# Patient Record
Sex: FEMALE | Race: BLACK OR AFRICAN AMERICAN | NOT HISPANIC OR LATINO | Employment: UNEMPLOYED | URBAN - METROPOLITAN AREA
[De-identification: names, ages, dates, MRNs, and addresses within clinical notes are randomized per-mention and may not be internally consistent; named-entity substitution may affect disease eponyms.]

---

## 2019-10-08 ENCOUNTER — OFFICE VISIT (OUTPATIENT)
Dept: FAMILY MEDICINE | Facility: CLINIC | Age: 34
End: 2019-10-08
Payer: COMMERCIAL

## 2019-10-08 VITALS
TEMPERATURE: 97.8 F | WEIGHT: 142 LBS | SYSTOLIC BLOOD PRESSURE: 120 MMHG | DIASTOLIC BLOOD PRESSURE: 72 MMHG | HEART RATE: 90 BPM | RESPIRATION RATE: 14 BRPM | BODY MASS INDEX: 28.63 KG/M2 | OXYGEN SATURATION: 99 % | HEIGHT: 59 IN

## 2019-10-08 DIAGNOSIS — M54.2 NECK PAIN: Primary | ICD-10-CM

## 2019-10-08 DIAGNOSIS — M75.21 BICEPS TENDONITIS, RIGHT: ICD-10-CM

## 2019-10-08 DIAGNOSIS — M62.838 MUSCLE SPASM: ICD-10-CM

## 2019-10-08 PROCEDURE — 99203 OFFICE O/P NEW LOW 30 MIN: CPT | Performed by: PHYSICIAN ASSISTANT

## 2019-10-08 RX ORDER — PRENATAL VIT/IRON FUM/FOLIC AC 27MG-0.8MG
1 TABLET ORAL DAILY
COMMUNITY
End: 2024-06-25

## 2019-10-08 RX ORDER — ACETAMINOPHEN 500 MG
500-1000 TABLET ORAL EVERY 6 HOURS PRN
Qty: 60 TABLET | Refills: 0 | Status: SHIPPED | OUTPATIENT
Start: 2019-10-08 | End: 2024-06-25

## 2019-10-08 ASSESSMENT — MIFFLIN-ST. JEOR: SCORE: 1259.74

## 2019-10-08 NOTE — PROGRESS NOTES
"Subjective     Larissa Gonzalez is a 34 year old female who presents to clinic today for the following health issues:    HPI   Musculoskeletal problem/pain      Duration: 2 months    Description  Location: right shoulder    Intensity:  mild    Accompanying signs and symptoms: none    History  Previous similar problem: no   Previous evaluation:  none    Precipitating or alleviating factors:  Trauma or overuse: no   Aggravating factors include: moving up, back, or forward    Therapies tried and outcome: Have not tried anything. In the past she got massages but has not tried that this time.       Larissa has been experiencing pain in the right side of her neck and in the anterior portion of her shoulder over the past few months.  No specific injury noted.The pain is constant, worse with arm movement specifically with external rotation of right arm. No radicular symptoms. She has tried massages.  Of note she is currently breastfeeding her child.           There is no problem list on file for this patient.    No past surgical history on file.    Social History     Tobacco Use     Smoking status: Never Smoker     Smokeless tobacco: Never Used   Substance Use Topics     Alcohol use: No     No family history on file.      Current Outpatient Medications   Medication Sig Dispense Refill     acetaminophen (TYLENOL) 500 MG tablet Take 1-2 tablets (500-1,000 mg) by mouth every 6 hours as needed for mild pain 60 tablet 0     Prenatal Vit-Fe Fumarate-FA (PRENATAL MULTIVITAMIN W/IRON) 27-0.8 MG tablet Take 1 tablet by mouth daily       No Known Allergies      Reviewed and updated as needed this visit by Provider         Review of Systems   ROS COMP: Constitutional, HEENT, cardiovascular, pulmonary, gi and gu systems are negative, except as otherwise noted.      Objective    /72   Pulse 90   Temp 97.8  F (36.6  C) (Tympanic)   Resp 14   Ht 1.499 m (4' 11\")   Wt 64.4 kg (142 lb)   SpO2 99%   BMI 28.68 kg/m    Body mass index " "is 28.68 kg/m .  Physical Exam   GENERAL: healthy, alert and no distress  MS: TTP along right paraspinal neck muscles FROM of neck with pain on lateral rotation of the neck.  FROM of UE bilaterally.  TTP at site of insertion of proximal biceps tendon  NEURO: Normal strength and tone, mentation intact and speech normal  PSYCH: mentation appears normal, affect normal/bright    Diagnostic Test Results:  Labs reviewed in Epic        Assessment & Plan     1. Biceps tendonitis, right  Advise that she may use Tylenol for pain she would benefit from PT at this time this referral was placed.  She will follow up if new or worsening symptoms  - acetaminophen (TYLENOL) 500 MG tablet; Take 1-2 tablets (500-1,000 mg) by mouth every 6 hours as needed for mild pain  Dispense: 60 tablet; Refill: 0  - [facility] REFERR[facility]; Future    2. Neck pain  - acetaminophen (TYLENOL) 500 MG tablet; Take 1-2 tablets (500-1,000 mg) by mouth every 6 hours as needed for mild pain  Dispense: 60 tablet; Refill: 0  - [facility]FERRAL; Future    3. Muscle spasm  - acetaminophen (TYLENOL) 500 MG tablet; Take 1-2 tablets (500-1,000 mg) by mouth every 6 hours as needed for mild pain  Dispense: 60 tablet; Refill: 0  - [facility]FERRAL; Future     BMI:   Estimated body mass index is 28.68 kg/m  as calculated from the following:    Height as of this encounter: 1.499 m (4' 11\").    Weight as of this encounter: 64.4 kg (142 lb).         Follow up if new or worsening symptoms    No follow-ups on file.    José Miguel Hutson PA-C  [facility]      " allergic reaction

## 2024-06-25 ENCOUNTER — OFFICE VISIT (OUTPATIENT)
Dept: FAMILY MEDICINE | Facility: CLINIC | Age: 39
End: 2024-06-25
Payer: COMMERCIAL

## 2024-06-25 VITALS
WEIGHT: 153 LBS | HEIGHT: 60 IN | OXYGEN SATURATION: 100 % | RESPIRATION RATE: 16 BRPM | DIASTOLIC BLOOD PRESSURE: 64 MMHG | TEMPERATURE: 97.4 F | HEART RATE: 68 BPM | BODY MASS INDEX: 30.04 KG/M2 | SYSTOLIC BLOOD PRESSURE: 100 MMHG

## 2024-06-25 DIAGNOSIS — M25.551 RIGHT HIP PAIN: ICD-10-CM

## 2024-06-25 DIAGNOSIS — M79.672 LEFT FOOT PAIN: ICD-10-CM

## 2024-06-25 DIAGNOSIS — E55.9 VITAMIN D DEFICIENCY: ICD-10-CM

## 2024-06-25 DIAGNOSIS — Z13.220 ENCOUNTER FOR LIPID SCREENING FOR CARDIOVASCULAR DISEASE: ICD-10-CM

## 2024-06-25 DIAGNOSIS — Z13.6 ENCOUNTER FOR LIPID SCREENING FOR CARDIOVASCULAR DISEASE: ICD-10-CM

## 2024-06-25 DIAGNOSIS — Z00.00 ROUTINE GENERAL MEDICAL EXAMINATION AT A HEALTH CARE FACILITY: Primary | ICD-10-CM

## 2024-06-25 DIAGNOSIS — L20.9 ATOPIC DERMATITIS AND RELATED CONDITION: ICD-10-CM

## 2024-06-25 DIAGNOSIS — M25.511 ACUTE PAIN OF RIGHT SHOULDER: ICD-10-CM

## 2024-06-25 DIAGNOSIS — R53.83 FATIGUE, UNSPECIFIED TYPE: ICD-10-CM

## 2024-06-25 LAB
ALBUMIN SERPL BCG-MCNC: 4.1 G/DL (ref 3.5–5.2)
ALP SERPL-CCNC: 99 U/L (ref 40–150)
ALT SERPL W P-5'-P-CCNC: 13 U/L (ref 0–50)
ANION GAP SERPL CALCULATED.3IONS-SCNC: 9 MMOL/L (ref 7–15)
AST SERPL W P-5'-P-CCNC: 23 U/L (ref 0–45)
BILIRUB SERPL-MCNC: 0.3 MG/DL
BUN SERPL-MCNC: 11.5 MG/DL (ref 6–20)
CALCIUM SERPL-MCNC: 9 MG/DL (ref 8.6–10)
CHLORIDE SERPL-SCNC: 105 MMOL/L (ref 98–107)
CHOLEST SERPL-MCNC: 189 MG/DL
CREAT SERPL-MCNC: 0.53 MG/DL (ref 0.51–0.95)
DEPRECATED HCO3 PLAS-SCNC: 22 MMOL/L (ref 22–29)
EGFRCR SERPLBLD CKD-EPI 2021: >90 ML/MIN/1.73M2
ERYTHROCYTE [DISTWIDTH] IN BLOOD BY AUTOMATED COUNT: 12.9 % (ref 10–15)
FASTING STATUS PATIENT QL REPORTED: YES
FASTING STATUS PATIENT QL REPORTED: YES
GLUCOSE SERPL-MCNC: 92 MG/DL (ref 70–99)
HCT VFR BLD AUTO: 35.3 % (ref 35–47)
HDLC SERPL-MCNC: 48 MG/DL
HGB BLD-MCNC: 12.1 G/DL (ref 11.7–15.7)
IRON BINDING CAPACITY (ROCHE): 301 UG/DL (ref 240–430)
IRON SATN MFR SERPL: 16 % (ref 15–46)
IRON SERPL-MCNC: 48 UG/DL (ref 37–145)
LDLC SERPL CALC-MCNC: 125 MG/DL
MCH RBC QN AUTO: 29.6 PG (ref 26.5–33)
MCHC RBC AUTO-ENTMCNC: 34.3 G/DL (ref 31.5–36.5)
MCV RBC AUTO: 86 FL (ref 78–100)
NONHDLC SERPL-MCNC: 141 MG/DL
PLATELET # BLD AUTO: 232 10E3/UL (ref 150–450)
POTASSIUM SERPL-SCNC: 3.9 MMOL/L (ref 3.4–5.3)
PROT SERPL-MCNC: 7.4 G/DL (ref 6.4–8.3)
RBC # BLD AUTO: 4.09 10E6/UL (ref 3.8–5.2)
SODIUM SERPL-SCNC: 136 MMOL/L (ref 135–145)
TRIGL SERPL-MCNC: 81 MG/DL
TSH SERPL DL<=0.005 MIU/L-ACNC: 1.88 UIU/ML (ref 0.3–4.2)
VIT B12 SERPL-MCNC: 518 PG/ML (ref 232–1245)
VIT D+METAB SERPL-MCNC: 15 NG/ML (ref 20–50)
WBC # BLD AUTO: 6.1 10E3/UL (ref 4–11)

## 2024-06-25 PROCEDURE — 80061 LIPID PANEL: CPT | Performed by: INTERNAL MEDICINE

## 2024-06-25 PROCEDURE — 83550 IRON BINDING TEST: CPT | Performed by: INTERNAL MEDICINE

## 2024-06-25 PROCEDURE — 83540 ASSAY OF IRON: CPT | Performed by: INTERNAL MEDICINE

## 2024-06-25 PROCEDURE — 85027 COMPLETE CBC AUTOMATED: CPT | Performed by: INTERNAL MEDICINE

## 2024-06-25 PROCEDURE — 84443 ASSAY THYROID STIM HORMONE: CPT | Performed by: INTERNAL MEDICINE

## 2024-06-25 PROCEDURE — 82306 VITAMIN D 25 HYDROXY: CPT | Performed by: INTERNAL MEDICINE

## 2024-06-25 PROCEDURE — 99385 PREV VISIT NEW AGE 18-39: CPT | Performed by: INTERNAL MEDICINE

## 2024-06-25 PROCEDURE — 80053 COMPREHEN METABOLIC PANEL: CPT | Performed by: INTERNAL MEDICINE

## 2024-06-25 PROCEDURE — 99215 OFFICE O/P EST HI 40 MIN: CPT | Mod: 25 | Performed by: INTERNAL MEDICINE

## 2024-06-25 PROCEDURE — 36415 COLL VENOUS BLD VENIPUNCTURE: CPT | Performed by: INTERNAL MEDICINE

## 2024-06-25 PROCEDURE — 82607 VITAMIN B-12: CPT | Performed by: INTERNAL MEDICINE

## 2024-06-25 RX ORDER — ERGOCALCIFEROL 1.25 MG/1
50000 CAPSULE, LIQUID FILLED ORAL WEEKLY
Qty: 12 CAPSULE | Refills: 0 | Status: SHIPPED | OUTPATIENT
Start: 2024-06-25

## 2024-06-25 RX ORDER — MELOXICAM 7.5 MG/1
1 TABLET ORAL DAILY
COMMUNITY
Start: 2024-06-11

## 2024-06-25 RX ORDER — KETOROLAC TROMETHAMINE 30 MG/ML
60 INJECTION, SOLUTION INTRAMUSCULAR; INTRAVENOUS ONCE
Status: DISCONTINUED | OUTPATIENT
Start: 2024-06-25 | End: 2024-06-25

## 2024-06-25 SDOH — HEALTH STABILITY: PHYSICAL HEALTH: ON AVERAGE, HOW MANY MINUTES DO YOU ENGAGE IN EXERCISE AT THIS LEVEL?: 0 MIN

## 2024-06-25 SDOH — HEALTH STABILITY: PHYSICAL HEALTH: ON AVERAGE, HOW MANY DAYS PER WEEK DO YOU ENGAGE IN MODERATE TO STRENUOUS EXERCISE (LIKE A BRISK WALK)?: 0 DAYS

## 2024-06-25 ASSESSMENT — SOCIAL DETERMINANTS OF HEALTH (SDOH): HOW OFTEN DO YOU GET TOGETHER WITH FRIENDS OR RELATIVES?: THREE TIMES A WEEK

## 2024-06-25 ASSESSMENT — PAIN SCALES - GENERAL: PAINLEVEL: NO PAIN (0)

## 2024-06-25 NOTE — PROGRESS NOTES
Preventive Care Visit  [facility] RICCO Jackson MD, Internal Medicine  Jun 25, 2024        Assessment and Plan  1. Routine general medical examination at a health care facility    Patient is new to [place], last seen our group in 2019 more than 3 years back.  She is here for annual physical, for right shoulder issues at that time.  No labs done anytime as per review of epic.    Recent provider visit in June 2024 for ongoing left foot pain for which she endorses that she has been underwent x-ray.  Care Everywhere reviewed which does show normal left foot x-ray.    Does have multiple new concerns of ongoing body aches,Rt hip pain, Rt shoulder pain and left foot pain.  Patient was very much confused on which side and more joint she has problems and had to go to and fro with the .  Opting to get all the baseline lab work done which was never done in the past as per review.    - diclofenac (VOLTAREN) 1 % topical gel; 2gm to left foot affected area Transdermal Four times a day for 30 days  - meloxicam (MOBIC) 7.5 MG tablet; Take 1 tablet by mouth daily  - PRIMARY CARE FOLLOW-UP SCHEDULING; Future  - Lipid panel reflex to direct LDL Fasting; Future  - Comprehensive metabolic panel (BMP + Alb, Alk Phos, ALT, AST, Total. Bili, TP); Future  - CBC with platelets; Future  - Iron and iron binding capacity; Future  - Vitamin B12; Future  - Vitamin D deficiency screening; Future  - TSH with free T4 reflex; Future  - Lipid panel reflex to direct LDL Fasting  - Comprehensive metabolic panel (BMP + Alb, Alk Phos, ALT, AST, Total. Bili, TP)  - CBC with platelets  - Iron and iron binding capacity  - Vitamin B12  - Vitamin D deficiency screening  - TSH with free T4 reflex    2. Right hip pain  New problem which patient endorses that this started few months back and has been having ongoing generalized body pains along with this.  Physical exam negative for any signs of arthritis at this time.   Most likely physical deconditioning related with physical stress and strain she has.  No acute needs of imaging at this time discussed with the patient, placed referral to physical therapy and continue current meloxicam as needed for pain.  - diclofenac (VOLTAREN) 1 % topical gel; 2gm to left foot affected area Transdermal Four times a day for 30 days  - meloxicam (MOBIC) 7.5 MG tablet; Take 1 tablet by mouth daily  - Physical Therapy  Referral; Future  - Comprehensive metabolic panel (BMP + Alb, Alk Phos, ALT, AST, Total. Bili, TP); Future  - Comprehensive metabolic panel (BMP + Alb, Alk Phos, ALT, AST, Total. Bili, TP)    3. Acute pain of right shoulder  New problem which patient endorses that this started few months back and has been having ongoing generalized body pains along with this.  Physical exam negative for any signs of arthritis at this time.  Most likely physical deconditioning related with physical stress and strain she has.  No acute needs of imaging at this time discussed with the patient, placed referral to physical therapy and continue current meloxicam as needed for pain.  - diclofenac (VOLTAREN) 1 % topical gel; 2gm to left foot affected area Transdermal Four times a day for 30 days  - meloxicam (MOBIC) 7.5 MG tablet; Take 1 tablet by mouth daily  - Physical Therapy  Referral; Future  - Comprehensive metabolic panel (BMP + Alb, Alk Phos, ALT, AST, Total. Bili, TP); Future  - Comprehensive metabolic panel (BMP + Alb, Alk Phos, ALT, AST, Total. Bili, TP)    4. Left foot pain  New problem which patient endorses that this started few months back and has been having ongoing generalized body pains along with this.  Physical exam negative for any signs of arthritis at this time.  Most likely physical deconditioning related with physical stress and strain she has.  No acute needs of imaging at this time discussed with the patient, placed referral to physical therapy and continue current  meloxicam as needed for pain.  - diclofenac (VOLTAREN) 1 % topical gel; 2gm to left foot affected area Transdermal Four times a day for 30 days  - meloxicam (MOBIC) 7.5 MG tablet; Take 1 tablet by mouth daily  - Physical Therapy  Referral; Future  - Comprehensive metabolic panel (BMP + Alb, Alk Phos, ALT, AST, Total. Bili, TP); Future  - Comprehensive metabolic panel (BMP + Alb, Alk Phos, ALT, AST, Total. Bili, TP)    5. Atopic dermatitis and related condition  - CBC with platelets; Future  - CBC with platelets    6. Encounter for lipid screening for cardiovascular disease  - Lipid panel reflex to direct LDL Fasting; Future  - Lipid panel reflex to direct LDL Fasting    7. Fatigue, unspecified type  New problem, this is the patient's major complaint today as she endorses that she has severe fatigue.  Discussed on possible metabolic causes versus vitamin deficiencies which can cause this.  I do not see any workup done anytime in the past and given that patient is on NSAIDs would emphasized to make sure this gets checked.  Patient understood and agreed the plan.  - Comprehensive metabolic panel (BMP + Alb, Alk Phos, ALT, AST, Total. Bili, TP); Future  - CBC with platelets; Future  - Iron and iron binding capacity; Future  - Vitamin B12; Future  - Vitamin D deficiency screening; Future  - TSH with free T4 reflex; Future  - Comprehensive metabolic panel (BMP + Alb, Alk Phos, ALT, AST, Total. Bili, TP)  - CBC with platelets  - Iron and iron binding capacity  - Vitamin B12  - Vitamin D deficiency screening  - TSH with free T4 reflex    8. Vitamin D deficiency  New problem, causing above fatigue. Sent in high dose to pharmacy.   - vitamin D2 (ERGOCALCIFEROL) 87989 units (1250 mcg) capsule; Take 1 capsule (50,000 Units) by mouth once a week  Dispense: 12 capsule; Refill: 0         [facility] interpretation services Ali # [phone]      Over 40 minutes spent outside the preventive visit ( 20 minutes ) on reviewing patient  "chart,  face to face encounter, greater than 50% time spent with plan/cordination of care and documentation as above in my A/P.         Please note that this note consists of symbols derived from keyboarding, dictation and/or voice recognition software. As a result, there may be errors in the script that have gone undetected. Please consider this when interpreting information found in this chart.    Patient Instructions   As discussed, please do fasting labs placed    Placed physical therapy for shoulder , hip and foot pains   , and plan for lab work for your fatigue    =======================================    Patient Education  Preventive Care Advice   This is general advice we often give to help people stay healthy. Your care team may have specific advice just for you. Please talk to your care team about your own preventive care needs.  Lifestyle  Exercise at least 150 minutes each week (30 minutes a day, 5 days a week).  Do muscle strengthening activities 2 days a week. These help control your weight and prevent disease.  No smoking.  Wear sunscreen to prevent skin cancer.  Have your home tested for radon every 2 to 5 years. Radon is a colorless, odorless gas that can harm your lungs. To learn more, go to www.health.[facility].[place] and search for \"Radon in Homes.\"  Keep guns unloaded and locked up in a safe place like a safe or gun vault, or, use a gun lock and hide the keys. Always lock away bullets separately. To learn more, visit [employer]com.[place].gov and search for \"safe gun storage.\"  Nutrition  Eat 5 or more servings of fruits and vegetables each day.  Try wheat bread, brown rice and whole grain pasta (instead of white bread, rice, and pasta).  Get enough calcium and vitamin D. Check the label on foods and aim for 100% of the RDA (recommended daily allowance).  Regular exams  Have a dental exam and cleaning every 6 months.  See your health care team every year to talk about:  Any changes in your health.  Any medicines " your care team has prescribed.  Preventive care, family planning, and ways to prevent chronic diseases.  Shots (vaccines)   HPV shots (up to age 26), if you've never had them before.  Hepatitis B shots (up to age 59), if you've never had them before.  COVID-19 shot: Get this shot when it's due.  Flu shot: Get a flu shot every year.  Tetanus shot: Get a tetanus shot every 10 years.  Pneumococcal, hepatitis A, and RSV shots: Ask your care team if you need these based on your risk.  Shingles shot (for age 50 and up).  General health tests  Diabetes screening:  Starting at age 35, Get screened for diabetes at least every 3 years.  If you are younger than age 35, ask your care team if you should be screened for diabetes.  Cholesterol test: At age 39, start having a cholesterol test every 5 years, or more often if advised.  Bone density scan (DEXA): At age 50, ask your care team if you should have this scan for osteoporosis (brittle bones).  Hepatitis C: Get tested at least once in your life.  Abdominal aortic aneurysm screening: Talk to your doctor about having this screening if you:  Have ever smoked; and  Are biologically male; and  Are between the ages of 65 and 75.  STIs (sexually transmitted infections)  Before age 24: Ask your care team if you should be screened for STIs.  After age 24: Get screened for STIs if you're at risk. You are at risk for STIs (including HIV) if:  You are sexually active with more than one person.  You don't use condoms every time.  You or a partner was diagnosed with a sexually transmitted infection.  If you are at risk for HIV, ask about PrEP medicine to prevent HIV.  Get tested for HIV at least once in your life, whether you are at risk for HIV or not.  Cancer screening tests  Cervical cancer screening: If you have a cervix, begin getting regular cervical cancer screening tests at age 21. Most people who have regular screenings with normal results can stop after age 65. Talk about this  with your provider.  Breast cancer scan (mammogram): If you've ever had breasts, begin having regular mammograms starting at age 40. This is a scan to check for breast cancer.  Colon cancer screening: It is important to start screening for colon cancer at age 45.  Have a colonoscopy test every 10 years (or more often if you're at risk) Or, ask your provider about stool tests like a FIT test every year or Cologuard test every 3 years.  To learn more about your testing options, visit: www.[employer]/082519.pdf.  For help making a decision, visit: toshia/ku25739.  Prostate cancer screening test: If you have a prostate and are age 55 to 69, ask your provider if you would benefit from a yearly prostate cancer screening test.  Lung cancer screening: If you are a current or former smoker age 50 to 80, ask your care team if ongoing lung cancer screenings are right for you.  For informational purposes only. Not to replace the advice of your health care provider. Copyright   2023 [facility]. All rights reserved. Clinically reviewed by the [facility] Transitions Program. [employer] 320696 - REV 04/24.     Return in about 4 weeks (around 7/23/2024), or if symptoms worsen or fail to improve, for PAP only visit .    Mary Jackson MD  [facility] RICCO Dias is a 39 year old, presenting for the following:  Physical        6/25/2024     8:30 AM   Additional Questions   Roomed by Tegan ALFARO        Health Care Directive  Patient does not have a Health Care Directive or Living Will:     HPI          6/25/2024   General Health   How would you rate your overall physical health? Good   Feel stress (tense, anxious, or unable to sleep) Not at all            6/25/2024   Nutrition   Three or more servings of calcium each day? (!) I DON'T KNOW   Diet: Regular (no restrictions)   How many servings of fruit and vegetables per day? (!) 2-3   How many sweetened beverages each day?  (!) 2            6/25/2024   Exercise   Days per week of moderate/strenous exercise 0 days   Average minutes spent exercising at this level 0 min      (!) EXERCISE CONCERN      6/25/2024   Social Factors   Frequency of gathering with friends or relatives Three times a week   Worry food won't last until get money to buy more No   Food not last or not have enough money for food? No   Do you have housing? (Housing is defined as stable permanent housing and does not include staying ouside in a car, in a tent, in an abandoned building, in an overnight shelter, or couch-surfing.) Yes   Are you worried about losing your housing? No   Lack of transportation? No   Unable to get utilities (heat,electricity)? No            6/25/2024   Dental   Dentist two times every year? (!) NO            6/25/2024   TB Screening   Were you born outside of the [place]? Yes        Today's PHQ-2 Score:       6/25/2024     8:37 AM   PHQ-2 ( 1999 [employer])   Q1: Little interest or pleasure in doing things 0   Q2: Feeling down, depressed or hopeless 0   PHQ-2 Score 0   Q1: Little interest or pleasure in doing things Not at all   Q2: Feeling down, depressed or hopeless Not at all   PHQ-2 Score 0           6/25/2024   Substance Use   Alcohol more than 3/day or more than 7/wk No   Do you use any other substances recreationally? No        Social History     Tobacco Use    Smoking status: Never    Smokeless tobacco: Never   Vaping Use    Vaping status: Never Used   Substance Use Topics    Alcohol use: No    Drug use: No          Mammogram Screening - Patient under 40 years of age: Routine Mammogram Screening not recommended.         6/25/2024   STI Screening   New sexual partner(s) since last STI/HIV test? No        History of abnormal Pap smear: No - age 30- 64 PAP with HPV every 5 years recommended             6/25/2024   Contraception/Family Planning   Questions about contraception or family planning No           Reviewed and updated as needed this visit  "by Provider                    History reviewed. No pertinent past medical history.  History reviewed. No pertinent surgical history.  OB History   No obstetric history on file.     Lab work is in process  Labs reviewed in EPIC  BP Readings from Last 3 Encounters:   06/25/24 100/64   10/08/19 120/72   10/23/14 (!) 89/62    Wt Readings from Last 3 Encounters:   06/25/24 69.4 kg (153 lb)   10/08/19 64.4 kg (142 lb)   10/23/14 62.1 kg (137 lb)                  Patient Active Problem List   Diagnosis    Atopic dermatitis and related condition     History reviewed. No pertinent surgical history.    Social History     Tobacco Use    Smoking status: Never    Smokeless tobacco: Never   Substance Use Topics    Alcohol use: No     History reviewed. No pertinent family history.      Current Outpatient Medications   Medication Sig Dispense Refill    diclofenac (VOLTAREN) 1 % topical gel 2gm to left foot affected area Transdermal Four times a day for 30 days      meloxicam (MOBIC) 7.5 MG tablet Take 1 tablet by mouth daily       No Known Allergies  No lab results found.       Review of Systems  Constitutional, HEENT, cardiovascular, pulmonary, GI, , musculoskeletal, neuro, skin, endocrine and psych systems are negative, except as otherwise noted.     Objective    Exam  /64 (BP Location: Right arm, Patient Position: Sitting, Cuff Size: Adult Regular)   Pulse 68   Temp 97.4  F (36.3  C) (Temporal)   Resp 16   Ht 1.52 m (4' 11.84\")   Wt 69.4 kg (153 lb)   LMP 06/18/2024 (Approximate)   SpO2 100%   BMI 30.04 kg/m     Estimated body mass index is 30.04 kg/m  as calculated from the following:    Height as of this encounter: 1.52 m (4' 11.84\").    Weight as of this encounter: 69.4 kg (153 lb).    Physical Exam  GENERAL: alert and no distress  EYES: Eyes grossly normal to inspection, PERRL and conjunctivae and sclerae normal  HENT: ear canals and TM's normal, nose and mouth without ulcers or lesions  NECK: no adenopathy, " no asymmetry, masses, or scars  RESP: lungs clear to auscultation - no rales, rhonchi or wheezes  BREAST: normal without masses, tenderness or nipple discharge and no palpable axillary masses or adenopathy  CV: regular rate and rhythm, normal S1 S2, no S3 or S4, no murmur, click or rub, no peripheral edema  ABDOMEN: soft, nontender, no hepatosplenomegaly, no masses and bowel sounds normal  MS: no gross musculoskeletal defects noted, no edema  Right shoulder exam, right hip exam, left foot exam : Normal joints per my exam on the bony prominences, no restriction of range of movements.  No signs of inflammation except subjective symptoms of muscle aches.  SKIN: no suspicious lesions or rashes  NEURO: Normal strength and tone, mentation intact and speech normal  PSYCH: mentation appears normal, affect normal/bright      Signed Electronically by: Mary Jackson MD

## 2024-06-25 NOTE — PATIENT INSTRUCTIONS
"As discussed, please do fasting labs placed    Placed physical therapy for shoulder , hip and foot pains   , and plan for lab work for your fatigue    =======================================    Patient Education   Preventive Care Advice   This is general advice we often give to help people stay healthy. Your care team may have specific advice just for you. Please talk to your care team about your own preventive care needs.  Lifestyle  Exercise at least 150 minutes each week (30 minutes a day, 5 days a week).  Do muscle strengthening activities 2 days a week. These help control your weight and prevent disease.  No smoking.  Wear sunscreen to prevent skin cancer.  Have your home tested for radon every 2 to 5 years. Radon is a colorless, odorless gas that can harm your lungs. To learn more, go to www.health.[facility].[place]. and search for \"Radon in Homes.\"  Keep guns unloaded and locked up in a safe place like a safe or gun vault, or, use a gun lock and hide the keys. Always lock away bullets separately. To learn more, visit [place].gov and search for \"safe gun storage.\"  Nutrition  Eat 5 or more servings of fruits and vegetables each day.  Try wheat bread, brown rice and whole grain pasta (instead of white bread, rice, and pasta).  Get enough calcium and vitamin D. Check the label on foods and aim for 100% of the RDA (recommended daily allowance).  Regular exams  Have a dental exam and cleaning every 6 months.  See your health care team every year to talk about:  Any changes in your health.  Any medicines your care team has prescribed.  Preventive care, family planning, and ways to prevent chronic diseases.  Shots (vaccines)   HPV shots (up to age 26), if you've never had them before.  Hepatitis B shots (up to age 59), if you've never had them before.  COVID-19 shot: Get this shot when it's due.  Flu shot: Get a flu shot every year.  Tetanus shot: Get a tetanus shot every 10 years.  Pneumococcal, hepatitis A, and RSV shots: " Ask your care team if you need these based on your risk.  Shingles shot (for age 50 and up).  General health tests  Diabetes screening:  Starting at age 35, Get screened for diabetes at least every 3 years.  If you are younger than age 35, ask your care team if you should be screened for diabetes.  Cholesterol test: At age 39, start having a cholesterol test every 5 years, or more often if advised.  Bone density scan (DEXA): At age 50, ask your care team if you should have this scan for osteoporosis (brittle bones).  Hepatitis C: Get tested at least once in your life.  Abdominal aortic aneurysm screening: Talk to your doctor about having this screening if you:  Have ever smoked; and  Are biologically male; and  Are between the ages of 65 and 75.  STIs (sexually transmitted infections)  Before age 24: Ask your care team if you should be screened for STIs.  After age 24: Get screened for STIs if you're at risk. You are at risk for STIs (including HIV) if:  You are sexually active with more than one person.  You don't use condoms every time.  You or a partner was diagnosed with a sexually transmitted infection.  If you are at risk for HIV, ask about PrEP medicine to prevent HIV.  Get tested for HIV at least once in your life, whether you are at risk for HIV or not.  Cancer screening tests  Cervical cancer screening: If you have a cervix, begin getting regular cervical cancer screening tests at age 21. Most people who have regular screenings with normal results can stop after age 65. Talk about this with your provider.  Breast cancer scan (mammogram): If you've ever had breasts, begin having regular mammograms starting at age 40. This is a scan to check for breast cancer.  Colon cancer screening: It is important to start screening for colon cancer at age 45.  Have a colonoscopy test every 10 years (or more often if you're at risk) Or, ask your provider about stool tests like a FIT test every year or Cologuard test every  3 years.  To learn more about your testing options, visit: www.[employer].net/159596.pdf.  For help making a decision, visit: toshia/ax25033.  Prostate cancer screening test: If you have a prostate and are age 55 to 69, ask your provider if you would benefit from a yearly prostate cancer screening test.  Lung cancer screening: If you are a current or former smoker age 50 to 80, ask your care team if ongoing lung cancer screenings are right for you.  For informational purposes only. Not to replace the advice of your health care provider. Copyright   2023 [employer]. All rights reserved. Clinically reviewed by the [facility] Transitions Program. appiris [id number] - REV 04/24.

## 2024-06-26 ENCOUNTER — TELEPHONE (OUTPATIENT)
Dept: FAMILY MEDICINE | Facility: CLINIC | Age: 39
End: 2024-06-26
Payer: COMMERCIAL

## 2024-06-26 NOTE — TELEPHONE ENCOUNTER
Triage Patient Outreach    Attempt # 1    Was call answered?  No.  Unable to leave message, recall needed.    Aissatou Murrieta RN

## 2024-06-26 NOTE — RESULT ENCOUNTER NOTE
Your Cholesterol is borderline normal. Given your age and no cardiac risk factors , recommend dietery management of avoiding high fat foods and red meat . Life style measures on weight reduction recommended.     Your Vitamin D levels are abnormally low, Sent high dose Vitamin D 50,000 units once a week to your pharmacy for 3 months. After you complete the 3 months course resume back to Over the Counter Vitamin D 2000 units daily.  Please let me know if you have any questions.    All your OTHER labs normal, you may see some highlighted which do not have Clinical significance.    Please let me know if you have any questions.  Mary Jackson MD on 6/25/2024   [facility]

## 2024-06-26 NOTE — TELEPHONE ENCOUNTER
----- Message from Mary Jackson sent at 6/25/2024 11:34 PM CDT -----  Your Cholesterol is borderline normal. Given your age and no cardiac risk factors , recommend dietery management of avoiding high fat foods and red meat . Life style measures on weight reduction recommended.     Your Vitamin D levels are abnormally low, Sent high dose Vitamin D 50,000 units once a week to your pharmacy for 3 months. After you complete the 3 months course resume back to Over the Counter Vitamin D 2000 units daily.  Please let me know if you have any questions.    All your OTHER labs normal, you may see some highlighted which do not have Clinical significance.    Please let me know if you have any questions.  Mary Jackson MD on 6/25/2024   [facility]

## 2024-06-27 NOTE — TELEPHONE ENCOUNTER
Triage Patient Outreach    Attempt # 2    Was call answered?  No.  Left voicemail to return call to [facility] at [facility]    Jessica Odom RN

## 2024-06-28 NOTE — TELEPHONE ENCOUNTER
Patient was given information per Dr. Jackson.       Aissatou MurrietaRN  [facility]